# Patient Record
Sex: FEMALE | Race: WHITE | Employment: FULL TIME | URBAN - METROPOLITAN AREA
[De-identification: names, ages, dates, MRNs, and addresses within clinical notes are randomized per-mention and may not be internally consistent; named-entity substitution may affect disease eponyms.]

---

## 2023-03-29 ENCOUNTER — OFFICE VISIT (OUTPATIENT)
Dept: ORTHOPEDIC SURGERY | Age: 28
End: 2023-03-29
Payer: COMMERCIAL

## 2023-03-29 DIAGNOSIS — S67.02XA CRUSHING INJURY OF LEFT THUMB, INITIAL ENCOUNTER: Primary | ICD-10-CM

## 2023-03-29 PROCEDURE — 99204 OFFICE O/P NEW MOD 45 MIN: CPT | Performed by: ORTHOPAEDIC SURGERY

## 2023-03-29 RX ORDER — AMITRIPTYLINE HYDROCHLORIDE 25 MG/1
25 TABLET, FILM COATED ORAL NIGHTLY
Qty: 30 TABLET | Refills: 0 | Status: SHIPPED | OUTPATIENT
Start: 2023-03-29 | End: 2023-04-28

## 2023-03-29 NOTE — LETTER
March 29, 2023       Libby Rao YOB: 1995   [address]  [facility] [address] Date of Visit:  3/29/2023       To Whom It May Concern: It is my medical opinion that Libby Rao {Work release (duty restriction):48291}. If you have any questions or concerns, please don't hesitate to call.     Sincerely,        Caryl Dumont MD

## 2023-03-29 NOTE — PROGRESS NOTES
[facility] Note    Name: Liana Escobar  YOB: 1995  Gender: female  MRN: [id number]      CC: Patient referred for evaluation of upper extremity pain    HPI: Liana Escobar is a 25 y.o. female Right hand dominant with a chief complaint of left thumb crush injury, the injury occurred 4 weeks ago at work when a industrial freezer door crushed her thumb, since then she has had swelling, pain, limited motion and paresthesias at the tip. ROS/Meds/PSH/PMH/FH/SH: I personally reviewed the patients standard intake form. Pertinents are discussed in the HPI    Physical Examination:  General: Awake and alert. HEENT: Normocephalic, atraumatic  CV/Pulm: Breathing even and unlabored  Skin: No obvious rashes noted. Lymphatic: No obvious evidence of lymphedema or lymphadenopathy    Musculoskeletal Exam:  Examination on the left upper extremity demonstrates cap refill < 5 seconds in all fingers, mild swelling of the left thumb, intact EPL and FPL, she does have sensation throughout the thumb but she feels paresthesias especially at the tip, she also reports radiating burning and electrical feelings on the dorsal aspect of the thumb into the radial aspect of the wrist.    Imaging / Electrodiagnostic Tests:     left Hand XR: AP, Lateral, Oblique and Thumb CMC joint     Clinical Indication:  1. Crushing injury of left thumb, initial encounter           Report: AP, lateral, oblique and thumb CMC joint hand XRs demonstrates well-maintained thumb joint spaces and alignment without evidence of fracture or dislocation    Impression: as above     Bentley Zavala MD         Assessment:   1. Crushing injury of left thumb, initial encounter        Plan:   We discussed the diagnosis and different treatment options. We discussed observation, therapy, antiinflammatory medications and other pertinent treatment modalities.     After discussing in detail the patient elects to proceed with Elavil 25 mg nightly for nerve